# Patient Record
Sex: MALE | URBAN - METROPOLITAN AREA
[De-identification: names, ages, dates, MRNs, and addresses within clinical notes are randomized per-mention and may not be internally consistent; named-entity substitution may affect disease eponyms.]

---

## 2019-09-06 ENCOUNTER — TELEPHONE (OUTPATIENT)
Dept: UROLOGY | Facility: AMBULATORY SURGERY CENTER | Age: 54
End: 2019-09-06

## 2019-09-06 NOTE — TELEPHONE ENCOUNTER
Reason for appointment/Complaint/Diagnosis : abnormal imaging on cat scan done at [facility] emergency room, possible lump    Insurance:Medicare and [facility]     History of Cancer? EK             If yes, what kind?n/a  Previous urologist?  patient does not remember    Records requested/where?   No/ patient has cd    Outside testing/where? no  Location Preference for office visit?[place]

## 2019-09-06 NOTE — TELEPHONE ENCOUNTER
[facility] will NOT release Medical Records without a signed consent  I called and spoke to the patient he will be stopping by to bring the disc so the nurse can triage and he expects to schedule his apt from there  He will also sign the consent to have records released